# Patient Record
Sex: FEMALE | Race: BLACK OR AFRICAN AMERICAN | Employment: OTHER | URBAN - METROPOLITAN AREA
[De-identification: names, ages, dates, MRNs, and addresses within clinical notes are randomized per-mention and may not be internally consistent; named-entity substitution may affect disease eponyms.]

---

## 2018-04-09 ENCOUNTER — OFFICE VISIT (OUTPATIENT)
Dept: INTERNAL MEDICINE CLINIC | Age: 48
End: 2018-04-09

## 2018-04-09 VITALS
HEIGHT: 66 IN | SYSTOLIC BLOOD PRESSURE: 115 MMHG | WEIGHT: 182 LBS | TEMPERATURE: 98.1 F | HEART RATE: 65 BPM | RESPIRATION RATE: 18 BRPM | BODY MASS INDEX: 29.25 KG/M2 | DIASTOLIC BLOOD PRESSURE: 81 MMHG | OXYGEN SATURATION: 99 %

## 2018-04-09 DIAGNOSIS — H61.22 EXCESSIVE CERUMEN IN LEFT EAR CANAL: ICD-10-CM

## 2018-04-09 DIAGNOSIS — L30.9 ECZEMA, UNSPECIFIED TYPE: ICD-10-CM

## 2018-04-09 DIAGNOSIS — Z86.39 HISTORY OF IRON DEFICIENCY: ICD-10-CM

## 2018-04-09 DIAGNOSIS — Z12.31 SCREENING MAMMOGRAM, ENCOUNTER FOR: ICD-10-CM

## 2018-04-09 DIAGNOSIS — Z98.84 LAP-BAND SURGERY STATUS: ICD-10-CM

## 2018-04-09 DIAGNOSIS — Z00.00 WELL WOMAN EXAM (NO GYNECOLOGICAL EXAM): Primary | ICD-10-CM

## 2018-04-09 RX ORDER — TRIAMCINOLONE ACETONIDE 1 MG/G
CREAM TOPICAL 2 TIMES DAILY
Qty: 30 G | Refills: 0 | Status: SHIPPED | OUTPATIENT
Start: 2018-04-09 | End: 2018-04-11 | Stop reason: SDUPTHER

## 2018-04-09 NOTE — MR AVS SNAPSHOT
[address] [facility], [place], operated by [facility] 
 
 
 [address]  Rona Jalloh [address] [place] [address] Road 
[phone] Patient: Jermaine Minors MRN: [id number] IID:[id number] Visit Information Date & Time Provider Department Dept. Phone Encounter #  
 4/9/2018  2:45 PM Kashif Cunha MD [facility] [address] [id number] Upcoming Health Maintenance Date Due DTaP/Tdap/Td series (1 - Tdap) 1/2/2010 PAP AKA CERVICAL CYTOLOGY 1/1/2017 Influenza Age 5 to Adult 9/1/2018* *Topic was postponed. The date shown is not the original due date. Allergies as of 4/9/2018  Review Complete On: 4/9/2018 By: Kashif Cunha MD  
  
 Severity Noted Reaction Type Reactions Aspirin High 01/29/2015    Anaphylaxis Nsaids (Non-steroidal Anti-inflammatory Drug) High 01/29/2015   Systemic Anaphylaxis Current Immunizations  Reviewed on 4/9/2018 Name Date Td 1/1/2010 Reviewed by Kashif Cunha MD on 4/9/2018 at  3:28 PM  
 Reviewed by Kashif Cunha MD on 4/9/2018 at  3:31 PM  
 Reviewed by Kashif Cunha MD on 4/9/2018 at  3:32 PM  
You Were Diagnosed With   
  
 Codes Comments Well woman exam (no gynecological exam)    -  Primary ICD-10-CM: Z00.00 ICD-9-CM: V70.0 Screening mammogram, encounter for     ICD-10-CM: Z12.31 
ICD-9-CM: V76.12 LAP-BAND surgery status     ICD-10-CM: Z98.84 ICD-9-CM: V45.86 History of iron deficiency     ICD-10-CM: Z86.39 
ICD-9-CM: V12.3 Eczema, unspecified type     ICD-10-CM: L30.9 ICD-9-CM: 692.9 Vitals BP Pulse Temp Resp Height(growth percentile) Weight(growth percentile) 115/81 (BP 1 Location: Left arm, BP Patient Position: Sitting) 65 98.1 °F (36.7 °C) (Oral) 18 5' 6\" (1.676 m) 182 lb (82.6 kg) LMP SpO2 BMI OB Status Smoking Status 03/15/2018 99% 29.38 kg/m2 Having regular periods Former Smoker Vitals History BMI and BSA Data  Body Mass Index Body Surface Area  
 29.38 kg/m 2 1.96 m 2  
  
  
 Preferred Pharmacy Pharmacy Name Phone [id number] [place], [address] AT [address] [phone] Your Updated Medication List  
  
   
This list is accurate as of 4/9/18  3:45 PM.  Always use your most recent med list.  
  
  
  
  
 triamcinolone acetonide 0.1 % topical cream  
Commonly known as:  KENALOG Apply  to affected area two (2) times a day. Prescriptions Sent to Pharmacy Refills  
 triamcinolone acetonide (KENALOG) 0.1 % topical cream 0 Sig: Apply  to affected area two (2) times a day. Class: Normal  
 Pharmacy: [address] #: [phone] Route: Topical  
  
We Performed the Following CBC W/O DIFF [22962 CPT(R)] HEMOGLOBIN A1C WITH EAG [64561 CPT(R)] IRON PROFILE M2990575 CPT(R)] LIPID PANEL [86855 CPT(R)] METABOLIC PANEL, COMPREHENSIVE [96234 CPT(R)] REFERRAL TO OBSTETRICS AND GYNECOLOGY [REF51 Custom] To-Do List   
 Around 04/18/2018 Imaging:  EMMY MAMMO BI SCREENING INCL CAD Referral Information Referral ID Referred By Referred To  
  
 [id number] Meg BARNES MD   
   [address] [place] [address] Phone: [phone] Fax: [phone] Visits Status Start Date End Date 1 New Request 4/9/18 4/9/19 If your referral has a status of pending review or denied, additional information will be sent to support the outcome of this decision. Introducing [facility]! Dear Alicia Eubanks: Thank you for requesting a VOSS account. Our records indicate that you already have an active VOSS account. You can access your account anytime at [email or web address] Did you know that you can access your hospital and ER discharge instructions at any time in VOSS?   You can also review all of your test results from your hospital stay or ER visit. Additional Information If you have questions, please visit the Frequently Asked Questions section of the [employer] website at [email or web address]. [employer]. [email or web address] Remember, [employer] is NOT to be used for urgent needs. For medical emergencies, dial 911. Now available from your iPhone and Android! Please provide this summary of care documentation to your next provider. Your primary care clinician is listed as Urban Sandro. If you have any questions after today's visit, please call [phone].

## 2018-04-09 NOTE — PROGRESS NOTES
Johana Jeffery is a 50 y.o. female  Presenting for her annual checkup and follow-up    Last breast exam: per gyn  Last PAP/pelvic: ? Last colonoscopy: none  Last DEXA:   Health Maintenance   Topic Date Due    DTaP/Tdap/Td series (1 - Tdap) 2010    PAP AKA CERVICAL CYTOLOGY  2017    Influenza Age 5 to Adult  2018 (Originally 2017)       Exercise: moderately active  Diet: generally follows a low fat low cholesterol diet    Vaccinations reviewed  Immunization History   Administered Date(s) Administered    Td 2010       Allergies: Aspirin and Nsaids (non-steroidal anti-inflammatory drug)  Current Outpatient Prescriptions   Medication Sig    triamcinolone acetonide (KENALOG) 0.1 % topical cream Apply  to affected area two (2) times a day. No current facility-administered medications for this visit. has a past medical history of Anemia; Gestational diabetes (); LAP-BAND surgery status (); and Raynaud phenomenon. Past Surgical History:   Procedure Laterality Date    HX  SECTION  2006    HX GI  2008    Lap band    HX WISDOM TEETH EXTRACTION        Social History     Social History    Marital status:      Spouse name: Israel Bryant ()    Number of children: 1    Years of education: N/A     Occupational History    Not on file.      Social History Main Topics    Smoking status: Former Smoker     Packs/day: 1.00     Types: Cigarettes     Quit date: 2006    Smokeless tobacco: Never Used    Alcohol use 4.8 oz/week     4 Glasses of wine, 4 Standard drinks or equivalent per week    Drug use: Not on file    Sexual activity: Yes     Partners: Male     Birth control/ protection: None     Other Topics Concern    Not on file     Social History Narrative     Family History   Problem Relation Age of Onset    Hypertension Mother     COPD Mother     Diabetes Father     Heart Disease Father      CHF    Hypertension Father     Diabetes Brother     Kidney Disease Brother     No Known Problems Daughter        Review of Systems - History obtained from the patient  General ROS: negative for - night sweats, weight gain or weight loss  Cardiovascular ROS: no chest pain, dyspnea on exertion, edema  GYN ROS: reports heavy menses for 3 days, no abnormal bleeding, pelvic pain or discharge, no breast pain or new or enlarging lumps on self exam.    Physical exam  Blood pressure 115/81, pulse 65, temperature 98.1 °F (36.7 °C), temperature source Oral, resp. rate 18, height 5' 6\" (1.676 m), weight 182 lb (82.6 kg), last menstrual period 03/15/2018, SpO2 99 %. Wt Readings from Last 3 Encounters:   04/09/18 182 lb (82.6 kg)   07/01/16 168 lb (76.2 kg)   01/29/15 175 lb (79.4 kg)     she appears well, alert and oriented x 3, pleasant and cooperative. Vitals as noted. No rashes or significant lesions. Neck supple and free of adenopathy, or masses. No thyromegaly or carotid bruits. Cranial nerves normal. Lungs are clear to auscultation. Heart sounds are normal with no murmurs, clicks, gallops or rubs. Abdomen is soft, non- tender, with no masses or organomegaly. Extremities, peripheral pulses and reflexes are normal.  . BREAST EXAM: not examined  PELVIC EXAM: referred      Diagnoses and all orders for this visit:    1. Well woman exam (no gynecological exam)  Recommend GYN eval.    -     EMMY MAMMO BI SCREENING INCL CAD; Future  -     [facility]  -     LIPID PANEL  -     METABOLIC PANEL, COMPREHENSIVE  -     HEMOGLOBIN A1C WITH EAG    2. Screening mammogram, encounter for    3. LAP-BAND surgery status    4. History of iron deficiency  -     CBC W/O DIFF  -     HEMOGLOBIN A1C WITH EAG  -     IRON PROFILE    5. Eczema, unspecified type - behind ears  -     triamcinolone acetonide (KENALOG) 0.1 % topical cream; Apply  to affected area two (2) times a day. 6. Excessive cerumen in left ear canal - Ceruminosis is noted.   Wax is removed by syringing and manual debridement. Instructions for home care to prevent wax buildup are given.         The patient is asked to make an attempt to improve diet and exercise patterns    Return for yearly wellness visits

## 2018-04-10 ENCOUNTER — TELEPHONE (OUTPATIENT)
Dept: INTERNAL MEDICINE CLINIC | Age: 48
End: 2018-04-10

## 2018-04-10 NOTE — TELEPHONE ENCOUNTER
----- Message from Seth Jordan sent at 4/10/2018  4:31 PM EDT -----  Regarding: Dr. Miguelina Marquez Refill  Pt is calling to have her rx \"Triamcinolone\" sent to [facility]. She called the pharmacy and nothing has been sent over. Best contact: [phone].

## 2018-04-11 DIAGNOSIS — L30.9 ECZEMA, UNSPECIFIED TYPE: ICD-10-CM

## 2018-04-11 RX ORDER — TRIAMCINOLONE ACETONIDE 1 MG/G
CREAM TOPICAL 2 TIMES DAILY
Qty: 30 G | Refills: 0 | Status: SHIPPED | OUTPATIENT
Start: 2018-04-11 | End: 2019-02-11 | Stop reason: SDUPTHER

## 2018-05-10 ENCOUNTER — HOSPITAL ENCOUNTER (OUTPATIENT)
Dept: MAMMOGRAPHY | Age: 48
Discharge: HOME | End: 2018-05-10
Attending: INTERNAL MEDICINE
Payer: COMMERCIAL

## 2018-05-10 DIAGNOSIS — Z00.00 WELL WOMAN EXAM (NO GYNECOLOGICAL EXAM): ICD-10-CM

## 2018-05-10 PROCEDURE — 77067 SCR MAMMO BI INCL CAD: CPT

## 2018-05-22 ENCOUNTER — OFFICE VISIT (OUTPATIENT)
Dept: OBGYN CLINIC | Age: 48
End: 2018-05-22

## 2018-05-22 VITALS
BODY MASS INDEX: 28.28 KG/M2 | WEIGHT: 176 LBS | HEIGHT: 66 IN | DIASTOLIC BLOOD PRESSURE: 64 MMHG | SYSTOLIC BLOOD PRESSURE: 106 MMHG

## 2018-05-22 DIAGNOSIS — N92.4 EXCESSIVE BLEEDING IN PREMENOPAUSAL PERIOD: ICD-10-CM

## 2018-05-22 DIAGNOSIS — Z11.51 SPECIAL SCREENING EXAMINATION FOR HUMAN PAPILLOMAVIRUS (HPV): ICD-10-CM

## 2018-05-22 DIAGNOSIS — Z01.419 ENCOUNTER FOR GYNECOLOGICAL EXAMINATION (GENERAL) (ROUTINE) WITHOUT ABNORMAL FINDINGS: Primary | ICD-10-CM

## 2018-05-22 NOTE — PROGRESS NOTES
[facility]  [email or web address]  [phone]    Author MD Kiko, [address] [facility]       Annual Gynecologic Exam  WWE 29-17  Chief Complaint   Patient presents with    Well Woman       Siddhartha Sawant is a 50 y.o. No obstetric history on file. BLACK OR   female who presents for an annual exam.  Patient's last menstrual period was 05/10/2018 (exact date). .    She does not report additional concerns today. Menstrual status:  Her periods are moderate in flow but have been shorter. She does not report dysmenorrhea/painful menses. She does not report irregular bleeding. Sexual history and Contraception:  History   Sexual Activity    Sexual activity: Yes    Partners: Male    Birth control/ protection: None       She does not reports new sexual partner(s) in the last year. The patient does not request STD testing. Preventive Medicine History:  Her most recent Pap smear result: normal per patient, obtained about 3 years ago. Her most recent HR HPV screen was negative, per patient obtained 3 years ago. She does not have a history of NINI 2, 3 or cervical cancer. Breast health:  Last mammogram: was normal.   A mammogram was not scheduled for today. She had a mammogram at [place] on 5/10/18, records in cc. Breast cancer family updated: see FH. Bone health: Reema Vazquez She does not have a history of osteopenia/osteoporosis. Osteoporosis family history updated: see . Past Medical History:   Diagnosis Date    Anemia     prior hx of mild iron def. now w increasing menses    Gestational diabetes 2006    LAP-BAND surgery status 2008    done in [place].  peak wt 264 lb.     Raynaud phenomenon      OB History   No data available       Past Surgical History:   Procedure Laterality Date    HX  SECTION  2006    HX GI  2008    Lap band    HX WISDOM TEETH EXTRACTION       Family History   Problem Relation Age of Onset    Hypertension Mother     COPD Mother     Diabetes Father     Heart Disease Father      CHF    Hypertension Father     Diabetes Brother     Kidney Disease Brother     No Known Problems Daughter      Social History     Social History    Marital status:      Spouse name: Liz Colon ()    Number of children: 1    Years of education: N/A     Occupational History    Not on file. Social History Main Topics    Smoking status: Former Smoker     Packs/day: 1.00     Types: Cigarettes     Quit date: 7/1/2006    Smokeless tobacco: Never Used    Alcohol use 4.8 oz/week     4 Glasses of wine, 4 Standard drinks or equivalent per week    Drug use: Not on file    Sexual activity: Yes     Partners: Male     Birth control/ protection: None     Other Topics Concern    Not on file     Social History Narrative       Allergies   Allergen Reactions    Aspirin Anaphylaxis    Nsaids (Non-Steroidal Anti-Inflammatory Drug) Anaphylaxis       Current Outpatient Prescriptions   Medication Sig    triamcinolone acetonide (KENALOG) 0.1 % topical cream Apply  to affected area two (2) times a day. No current facility-administered medications for this visit.         Patient Active Problem List   Diagnosis Code    LAP-BAND surgery status Z98.84    Gestational diabetes O24.419    Anemia D64.9    Raynaud phenomenon I73.00       Review of Systems - History obtained from the patient  Constitutional: negative for weight loss, fever, night sweats  HEENT: negative for hearing loss, earache, congestion, snoring, sorethroat  CV: negative for chest pain, palpitations, edema  Resp: negative for cough, shortness of breath, wheezing  GI: negative for change in bowel habits, abdominal pain, black or bloody stools  : negative for frequency, dysuria, hematuria, vaginal discharge, see HPI  MSK: negative for back pain, joint pain, muscle pain  Breast: negative for breast lumps, nipple discharge, galactorrhea  Skin :negative for itching, rash, hives  Neuro: negative for dizziness, headache, confusion, weakness  Psych: negative for anxiety, depression, change in mood  Heme/lymph: negative for bleeding, bruising, pallor    Physical Exam  Visit Vitals    /64    Ht 5' 6\" (1.676 m)    Wt 176 lb (79.8 kg)    LMP 05/10/2018 (Exact Date)    BMI 28.41 kg/m2       Constitutional  · Appearance: well-nourished, well developed, alert, in no acute distress    HENT  · Head and Face: appears normal    Neck  · Inspection/Palpation: normal appearance, no masses or tenderness  · Lymph Nodes: no lymphadenopathy present  · Thyroid: gland size normal, nontender, no nodules or masses present on palpation    Chest  · Respiratory Effort: breathing unlabored  · Auscultation: normal breath sounds    Cardiovascular  · Heart:  · Auscultation: regular rate and rhythm without murmur    Breasts  · Inspection of Breasts: breasts symmetrical, no skin changes, no discharge present, nipple appearance normal, no skin retraction present  · Palpation of Breasts and Axillae: no masses present on palpation, no breast tenderness  · Axillary Lymph Nodes: no lymphadenopathy present    Gastrointestinal  · Abdominal Examination: abdomen non-tender to palpation, normal bowel sounds, no masses present  · Liver and spleen: no hepatomegaly present, spleen not palpable  · Hernias: no hernias identified    Genitourinary  · External Genitalia: normal appearance for age, no discharge present, no tenderness present, no inflammatory lesions present, no masses present, without atrophy present  · Vagina: normal vaginal vault without central or paravaginal defects, no discharge present, no inflammatory lesions present, no masses present  · Bladder: non-tender to palpation  · Urethra: appears normal  · Cervix: normal   · Uterus: normal size, shape and consistency  · Adnexa: no adnexal tenderness present, no adnexal masses present  · Perineum: perineum within normal limits, no evidence of trauma, no rashes or skin lesions present  · Anus: anus within normal limits, no hemorrhoids present  · Inguinal Lymph Nodes: no lymphadenopathy present    Skin  · General Inspection: no rash, no lesions identified    Neurologic/Psychiatric  · Mental Status:  · Orientation: grossly oriented to person, place and time  · Mood and Affect: mood normal, affect appropriate    Assessment:  50 y.o. No obstetric history on file. for well woman exam  Encounter Diagnoses   Name Primary?  Encounter for gynecological examination (general) (routine) without abnormal findings Yes    Special screening examination for human papillomavirus (HPV)     Excessive bleeding in premenopausal period        Plan:  The patient was counseled about diet, exercise, healthy lifestyle  We discussed current self breast exam and mammogram recommendations  We discussed current pap smear and HR HPV testing guidelines  We recommend follow up in one year for routine annual gynecologic exam or sooner if needed  We recommend follow up with a primary care physician for any chronic medical problems or non-gynecologic concerns    We discussed calcium/vitamin D/weight bearing exercise and osteoporosis prevention  Handouts were given to the patient  We discussed safer NSAID dosing for heavy cycles. Pt was advised to take this dose with food and not to take for more than 5 days. Disc RBA including bleeding/gastric irritation. JACINTO NUNEZ if NI to discuss other options. We discussed typical perimenopausal bleeding patterns and symptoms. I recommend that she notify MD for chaotic or symptomatic bleeding, or bleeding in between menses or intermenstrual bleeding for additional evaluation. She can also schedule a consult to discuss management of symptoms of perimenopause that are concerning her or interfering with her life or day to day function or activity.    Pt prefers observation    Disc HPV vaccine for daughter, h/o given       Folllow up:  [x] return for annual well woman exam in one year or sooner if she is having problems  [] follow up and ultrasound  [x] mammogram  [] 6 months  [] 3 months  [] 1 month    Orders Placed This Encounter    PAP IG, HPV AND RFX HPV 35/65,58([id number])       No results found for any visits on 05/22/18.

## 2018-05-22 NOTE — PATIENT INSTRUCTIONS
Breast Self-Exam: Care Instructions  Your Care Instructions    A breast self-exam is when you check your breasts for lumps or changes. This regular exam helps you learn how your breasts normally look and feel. Most breast problems or changes are not because of cancer. Breast self-exam is not a substitute for a mammogram. Having regular breast exams by your doctor and regular mammograms improve your chances of finding any problems with your breasts. Some women set a time each month to do a step-by-step breast self-exam. Other women like a less formal system. They might look at their breasts as they brush their teeth, or feel their breasts once in a while in the shower. If you notice a change in your breast, tell your doctor. Follow-up care is a key part of your treatment and safety. Be sure to make and go to all appointments, and call your doctor if you are having problems. It's also a good idea to know your test results and keep a list of the medicines you take. How do you do a breast self-exam?  · The best time to examine your breasts is usually one week after your menstrual period begins. Your breasts should not be tender then. If you do not have periods, you might do your exam on a day of the month that is easy to remember. · To examine your breasts:  ¨ Remove all your clothes above the waist and lie down. When you are lying down, your breast tissue spreads evenly over your chest wall, which makes it easier to feel all your breast tissue. ¨ Use the pads-not the fingertips-of the 3 middle fingers of your left hand to check your right breast. Move your fingers slowly in small coin-sized circles that overlap. ¨ Use three levels of pressure to feel of all your breast tissue. Use light pressure to feel the tissue close to the skin surface. Use medium pressure to feel a little deeper. Use firm pressure to feel your tissue close to your breastbone and ribs.  Use each pressure level to feel your breast tissue before moving on to the next spot. ¨ Check your entire breast, moving up and down as if following a strip from the collarbone to the bra line, and from the armpit to the ribs. Repeat until you have covered the entire breast.  ¨ Repeat this procedure for your left breast, using the pads of the 3 middle fingers of your right hand. · To examine your breasts while in the shower:  ¨ Place one arm over your head and lightly soap your breast on that side. ¨ Using the pads of your fingers, gently move your hand over your breast (in the strip pattern described above), feeling carefully for any lumps or changes. ¨ Repeat for the other breast.  · Have your doctor inspect anything you notice to see if you need further testing. Where can you learn more? Go to http://sharifa-valente.info/. Enter P148 in the search box to learn more about \"Breast Self-Exam: Care Instructions. \"  Current as of: May 12, 2017  Content Version: 11.4  © 4850-3923 [employer]wise, [employer]. Care instructions adapted under license by [employer] (which disclaims liability or warranty for this information). If you have questions about a medical condition or this instruction, always ask your healthcare professional. [place] [address] any warranty or liability for your use of this information.

## 2018-05-22 NOTE — MR AVS SNAPSHOT
[address] [facility] [address]7 [facility] 
[phone] Patient: Kelvin Fajardo MRN: [id number] HMB:[birth year] Visit Information Date & Time Provider Department Dept. Phone Encounter #  
 5/22/2018 10:40 AM MD [facility] [address] [id number] Follow-up Instructions Return in about 1 year (around 5/22/2019), or if symptoms worsen or fail to improve, for Annual exam. Upcoming Health Maintenance Date Due  
 PAP AKA CERVICAL CYTOLOGY 1/1/2017 Influenza Age 5 to Adult 9/1/2018* *Topic was postponed. The date shown is not the original due date. Allergies as of 5/22/2018  Review Complete On: 5/22/2018 By: Alva Hawthorne LPN Severity Noted Reaction Type Reactions Aspirin High 01/29/2015    Anaphylaxis Nsaids (Non-steroidal Anti-inflammatory Drug) High 01/29/2015   Systemic Anaphylaxis Current Immunizations  Reviewed on 4/9/2018 Name Date Td 1/1/2010 Not reviewed this visit You Were Diagnosed With   
  
 Codes Comments Encounter for gynecological examination (general) (routine) without abnormal findings    -  Primary ICD-10-CM: F18.960 ICD-9-CM: V72.31 Vitals BP Height(growth percentile) Weight(growth percentile) LMP BMI OB Status 106/64 5' 6\" (1.676 m) 176 lb (79.8 kg) 05/10/2018 (Exact Date) 28.41 kg/m2 Having regular periods Smoking Status Former Smoker BMI and BSA Data Body Mass Index Body Surface Area  
 28.41 kg/m 2 1.93 m 2 Preferred Pharmacy Pharmacy Name Phone [id number] [place], [address] AT [facility] [phone] Your Updated Medication List  
  
   
This list is accurate as of 5/22/18 11:12 AM.  Always use your most recent med list.  
  
  
  
  
 triamcinolone acetonide 0.1 % topical cream  
Commonly known as:  KENALOG Apply  to affected area two (2) times a day. Follow-up Instructions Return in about 1 year (around 5/22/2019), or if symptoms worsen or fail to improve, for Annual exam.  
  
  
Patient Instructions Breast Self-Exam: Care Instructions Your Care Instructions A breast self-exam is when you check your breasts for lumps or changes. This regular exam helps you learn how your breasts normally look and feel. Most breast problems or changes are not because of cancer. Breast self-exam is not a substitute for a mammogram. Having regular breast exams by your doctor and regular mammograms improve your chances of finding any problems with your breasts. Some women set a time each month to do a step-by-step breast self-exam. Other women like a less formal system. They might look at their breasts as they brush their teeth, or feel their breasts once in a while in the shower. If you notice a change in your breast, tell your doctor. Follow-up care is a key part of your treatment and safety. Be sure to make and go to all appointments, and call your doctor if you are having problems. It's also a good idea to know your test results and keep a list of the medicines you take. How do you do a breast self-exam? 
· The best time to examine your breasts is usually one week after your menstrual period begins. Your breasts should not be tender then. If you do not have periods, you might do your exam on a day of the month that is easy to remember. · To examine your breasts: ¨ Remove all your clothes above the waist and lie down. When you are lying down, your breast tissue spreads evenly over your chest wall, which makes it easier to feel all your breast tissue. ¨ Use the pads-not the fingertips-of the 3 middle fingers of your left hand to check your right breast. Move your fingers slowly in small coin-sized circles that overlap. ¨ Use three levels of pressure to feel of all your breast tissue.  Use light pressure to feel the tissue close to the skin surface. Use medium pressure to feel a little deeper. Use firm pressure to feel your tissue close to your breastbone and ribs. Use each pressure level to feel your breast tissue before moving on to the next spot. ¨ Check your entire breast, moving up and down as if following a strip from the collarbone to the bra line, and from the armpit to the ribs. Repeat until you have covered the entire breast. 
¨ Repeat this procedure for your left breast, using the pads of the 3 middle fingers of your right hand. · To examine your breasts while in the shower: 
¨ Place one arm over your head and lightly soap your breast on that side. ¨ Using the pads of your fingers, gently move your hand over your breast (in the strip pattern described above), feeling carefully for any lumps or changes. ¨ Repeat for the other breast. 
· Have your doctor inspect anything you notice to see if you need further testing. Where can you learn more? Go to http://sharifa-valente.info/. Enter P148 in the search box to learn more about \"Breast Self-Exam: Care Instructions. \" Current as of: May 12, 2017 Content Version: 11.4 © 9280-5950 [employer]. Care instructions adapted under license by [employer] (which disclaims liability or warranty for this information). If you have questions about a medical condition or this instruction, always ask your healthcare professional. [place] [address] any warranty or liability for your use of this information. Introducing [facility]! Dear Freddie Hart: Thank you for requesting a [employer] account. Our records indicate that you already have an active [employer] account. You can access your account anytime at [email or web address] (Closed). [email or web address] (Closed) Did you know that you can access your hospital and ER discharge instructions at any time in [employer]?   You can also review all of your test results from your hospital stay or ER visit. Additional Information If you have questions, please visit the Frequently Asked Questions section of the [employer] website at [email or web address] Remember, [employer] is NOT to be used for urgent needs. For medical emergencies, dial 911. Now available from your iPhone and Android! Please provide this summary of care documentation to your next provider. Your primary care clinician is listed as Lauren Goodell. If you have any questions after today's visit, please call [phone].

## 2018-05-24 LAB
CYTOLOGIST CVX/VAG CYTO: NORMAL
CYTOLOGY CVX/VAG DOC THIN PREP: NORMAL
CYTOLOGY HISTORY:: NORMAL
DX ICD CODE: NORMAL
HPV I/H RISK 1 DNA CVX QL PROBE+SIG AMP: NEGATIVE
Lab: NORMAL
OTHER STN SPEC: NORMAL
PATH REPORT.FINAL DX SPEC: NORMAL
STAT OF ADQ CVX/VAG CYTO-IMP: NORMAL

## 2019-02-11 ENCOUNTER — OFFICE VISIT (OUTPATIENT)
Dept: INTERNAL MEDICINE CLINIC | Age: 49
End: 2019-02-11

## 2019-02-11 VITALS
TEMPERATURE: 98.7 F | OXYGEN SATURATION: 99 % | HEART RATE: 73 BPM | SYSTOLIC BLOOD PRESSURE: 110 MMHG | WEIGHT: 178.13 LBS | DIASTOLIC BLOOD PRESSURE: 72 MMHG | RESPIRATION RATE: 18 BRPM | HEIGHT: 66 IN | BODY MASS INDEX: 28.63 KG/M2

## 2019-02-11 DIAGNOSIS — Z00.00 PREVENTATIVE HEALTH CARE: ICD-10-CM

## 2019-02-11 DIAGNOSIS — D50.8 OTHER IRON DEFICIENCY ANEMIA: ICD-10-CM

## 2019-02-11 DIAGNOSIS — K21.9 GASTROESOPHAGEAL REFLUX DISEASE, ESOPHAGITIS PRESENCE NOT SPECIFIED: Primary | ICD-10-CM

## 2019-02-11 DIAGNOSIS — Z13.21 ENCOUNTER FOR VITAMIN DEFICIENCY SCREENING: ICD-10-CM

## 2019-02-11 DIAGNOSIS — Z98.84 LAP-BAND SURGERY STATUS: ICD-10-CM

## 2019-02-11 RX ORDER — RANITIDINE 300 MG/1
300 TABLET ORAL DAILY
Qty: 30 TAB | Refills: 0
Start: 2019-02-11

## 2019-02-11 NOTE — PROGRESS NOTES
HISTORY OF PRESENT ILLNESS Chief Complaint Patient presents with  GERD  
  chest discomfort with eating  for 5-6 days. Now resolved Presents with chest pains for 7 day(s). Occurs when drinking coffee and after eating. Mild belching increase. Sometimes food gets stuck. Has lap-band  in [place]. Last eval 7 years ago in [place]. Pain is not exertional.  
No hx esophagus. Kelli Perdomo No SOB. No dark stools Took gas-ex Review of Systems All other systems reviewed and are negative, except as noted in HPI Past Medical and Surgical History 
 has a past medical history of Anemia, Gestational diabetes (), HSV-2 infection, LAP-BAND surgery status (), Pap smear for cervical cancer screening (2018), and Raynaud phenomenon. has a past surgical history that includes hx gi (); hx  section (2006); and hx wisdom teeth extraction. reports that she quit smoking about 12 years ago. Her smoking use included cigarettes. She smoked 1.00 pack per day. she has never used smokeless tobacco. She reports that she drinks about 4.8 oz of alcohol per week. family history includes COPD in her mother; Diabetes in her brother and father; Heart Disease in her father; Hypertension in her father and mother; Kidney Disease in her brother; No Known Problems in her daughter. Physical Exam  
Nursing note and vitals reviewed. Blood pressure 110/72, pulse 73, temperature 98.7 °F (37.1 °C), temperature source Oral, resp. rate 18, height 5' 6\" (1.676 m), weight 178 lb 2 oz (80.8 kg), last menstrual period 2019, SpO2 99 %. Constitutional: In no distress. Eyes: Conjunctivae are normal. 
HEENT:  No LAD or thyromegaly Cardiovascular: Normal rate. regular rhythm. No murmurs No edema Pulmonary/Chest: Effort normal. clear to ausculation blaterally Musculoskeletal:  no edema. Abd: 
Neurological: Alert and oriented. Grossly intact cranial nerves and motor function. Skin: No rash noted. Psychiatric: Normal mood and affect. Behavior is normal.  
 
ASSESSMENT and PLAN Diagnoses and all orders for this visit: 
 
1. Gastroesophageal reflux disease, esophagitis presence not specified Improved over the last few days. Needs eval by bariatric surgery given lap-band hx. May need endoscopy. Will first check esophogram. 
-     XR BA SWALLOW ESOPHOGRAM; Future 
-     raNITIdine (ZANTAC) 300 mg tab; Take 1 Tab by mouth daily. 2. LAP-BAND surgery status -     XR BA SWALLOW ESOPHOGRAM; Future 
-     REFERRAL TO BARIATRIC SURGERY 3. Other iron deficiency anemia 
-     CBC W/O DIFF 
-     IRON PROFILE 
 
4. Encounter for vitamin deficiency screening 5. Preventative health care -     LIPID PANEL 
-     METABOLIC PANEL, COMPREHENSIVE 
 
 
 
lab results and schedule of future lab studies reviewed with patient 
reviewed medications and side effects in detail Return to clinic for further evaluation if new symptoms develop or if current symptoms worsen or fail to resolve. There are no Patient Instructions on file for this visit.

## 2019-02-12 LAB
ALBUMIN SERPL-MCNC: 3.9 G/DL (ref 3.5–5.5)
ALBUMIN/GLOB SERPL: 1.4 {RATIO} (ref 1.2–2.2)
ALP SERPL-CCNC: 51 IU/L (ref 39–117)
ALT SERPL-CCNC: 11 IU/L (ref 0–32)
AST SERPL-CCNC: 18 IU/L (ref 0–40)
BILIRUB SERPL-MCNC: 0.5 MG/DL (ref 0–1.2)
BUN SERPL-MCNC: 14 MG/DL (ref 6–24)
BUN/CREAT SERPL: 16 (ref 9–23)
CALCIUM SERPL-MCNC: 9 MG/DL (ref 8.7–10.2)
CHLORIDE SERPL-SCNC: 104 MMOL/L (ref 96–106)
CHOLEST SERPL-MCNC: 132 MG/DL (ref 100–199)
CO2 SERPL-SCNC: 21 MMOL/L (ref 20–29)
CREAT SERPL-MCNC: 0.85 MG/DL (ref 0.57–1)
ERYTHROCYTE [DISTWIDTH] IN BLOOD BY AUTOMATED COUNT: 17.8 % (ref 12.3–15.4)
GLOBULIN SER CALC-MCNC: 2.7 G/DL (ref 1.5–4.5)
GLUCOSE SERPL-MCNC: 70 MG/DL (ref 65–99)
HCT VFR BLD AUTO: 38.9 % (ref 34–46.6)
HDLC SERPL-MCNC: 62 MG/DL
HGB BLD-MCNC: 12.1 G/DL (ref 11.1–15.9)
INTERPRETATION, 910389: NORMAL
IRON SATN MFR SERPL: 32 % (ref 15–55)
IRON SERPL-MCNC: 100 UG/DL (ref 27–159)
LDLC SERPL CALC-MCNC: 55 MG/DL (ref 0–99)
MCH RBC QN AUTO: 22.9 PG (ref 26.6–33)
MCHC RBC AUTO-ENTMCNC: 31.1 G/DL (ref 31.5–35.7)
MCV RBC AUTO: 74 FL (ref 79–97)
PLATELET # BLD AUTO: 318 X10E3/UL (ref 150–379)
POTASSIUM SERPL-SCNC: 4.7 MMOL/L (ref 3.5–5.2)
PROT SERPL-MCNC: 6.6 G/DL (ref 6–8.5)
RBC # BLD AUTO: 5.29 X10E6/UL (ref 3.77–5.28)
SODIUM SERPL-SCNC: 143 MMOL/L (ref 134–144)
TIBC SERPL-MCNC: 310 UG/DL (ref 250–450)
TRIGL SERPL-MCNC: 73 MG/DL (ref 0–149)
UIBC SERPL-MCNC: 210 UG/DL (ref 131–425)
VLDLC SERPL CALC-MCNC: 15 MG/DL (ref 5–40)
WBC # BLD AUTO: 6.8 X10E3/UL (ref 3.4–10.8)

## 2021-09-14 ENCOUNTER — APPOINTMENT (OUTPATIENT)
Dept: GENERAL RADIOLOGY | Age: 51
End: 2021-09-14
Attending: NURSE PRACTITIONER
Payer: COMMERCIAL

## 2021-09-14 ENCOUNTER — HOSPITAL ENCOUNTER (EMERGENCY)
Age: 51
Discharge: HOME | End: 2021-09-14
Attending: EMERGENCY MEDICINE
Payer: COMMERCIAL

## 2021-09-14 VITALS
HEART RATE: 63 BPM | TEMPERATURE: 98.2 F | HEIGHT: 67 IN | RESPIRATION RATE: 18 BRPM | DIASTOLIC BLOOD PRESSURE: 68 MMHG | SYSTOLIC BLOOD PRESSURE: 122 MMHG | WEIGHT: 162.04 LBS | OXYGEN SATURATION: 100 % | BODY MASS INDEX: 25.43 KG/M2

## 2021-09-14 DIAGNOSIS — M79.18 MUSCULOSKELETAL PAIN: Primary | ICD-10-CM

## 2021-09-14 LAB
APPEARANCE UR: ABNORMAL
BACTERIA URNS QL MICRO: ABNORMAL /HPF
BILIRUB UR QL: NEGATIVE
COLOR UR: ABNORMAL
COMMENT, HOLDF: NORMAL
EPITH CASTS URNS QL MICRO: ABNORMAL /LPF
GLUCOSE UR STRIP.AUTO-MCNC: NEGATIVE MG/DL
HGB UR QL STRIP: NEGATIVE
KETONES UR QL STRIP.AUTO: 15 MG/DL
LEUKOCYTE ESTERASE UR QL STRIP.AUTO: NEGATIVE
MUCOUS THREADS URNS QL MICRO: ABNORMAL /LPF
NITRITE UR QL STRIP.AUTO: NEGATIVE
PH UR STRIP: 5.5 [PH] (ref 5–8)
PROT UR STRIP-MCNC: NEGATIVE MG/DL
RBC #/AREA URNS HPF: ABNORMAL /HPF (ref 0–5)
SAMPLES BEING HELD,HOLD: NORMAL
SP GR UR REFRACTOMETRY: 1.02 (ref 1–1.03)
UROBILINOGEN UR QL STRIP.AUTO: 0.2 EU/DL (ref 0.2–1)
WBC URNS QL MICRO: ABNORMAL /HPF (ref 0–4)

## 2021-09-14 PROCEDURE — 81001 URINALYSIS AUTO W/SCOPE: CPT

## 2021-09-14 PROCEDURE — 74019 RADEX ABDOMEN 2 VIEWS: CPT

## 2021-09-14 PROCEDURE — 99282 EMERGENCY DEPT VISIT SF MDM: CPT

## 2021-09-14 RX ORDER — METHOCARBAMOL 750 MG/1
750 TABLET, FILM COATED ORAL 4 TIMES DAILY
Qty: 20 TABLET | Refills: 0 | Status: SHIPPED | OUTPATIENT
Start: 2021-09-14

## 2021-09-14 NOTE — ED NOTES

## 2021-09-14 NOTE — Clinical Note
[address]  [facility] EMERGENCY DEPT  [address] [id number] [phone]    Work/School Note    Date: 9/14/2021    To Whom It May concern:    Brian Flood was seen and treated today in the emergency room by the following provider(s):  Attending Provider: Christy Rowland DO  Nurse Practitioner: Griselda Cox NP.      Brian Flood is excused from work/school on 09/14/21 and 09/15/21. She is medically clear to return to work/school on 9/16/2021.        Sincerely,          Dori Lancaster NP

## 2021-09-14 NOTE — ED TRIAGE NOTES
Patient arrives with complaint of left sided lower back pain. Denies no recent injury. Pain present since Sunday morning. Denies pain with urination, N/V, abdominal pain, cough, fever, SOB, chest pain.

## 2021-09-14 NOTE — ED PROVIDER NOTES
This is a 20-year-old female who presents ambulatory to the emergency room with complaints of lower back pain. Patient states is specifically located on the left side at the lumbar spine area. States it started on  morning. Denies any trauma or recent injury to her lumbar spine. Denies any chest pain, shortness of breath, dizziness, nausea or vomiting, fever or chills. Denies any difficulty urinating, dysuria, urinary urgency or frequency. States her pain is 7 out of 10 located on the left side. States it is aching in nature. Has not taken any medication prior to arrival for her symptoms. There are no further complaints at this time. Nikkie Sutherland MD  Past Medical History:  No date: Anemia      Comment:  prior hx of mild iron def. now w increasing menses  2006: Gestational diabetes  No date: HSV-2 infection  2008: LAP-BAND surgery status      Comment:  done in [place].  peak wt 264 lb.  2018: Pap smear for cervical cancer screening      Comment:  Neg pap and neg HPV  No date: Raynaud phenomenon  Past Surgical History:  2006: HX  SECTION  2008: HX GI      Comment:  Lap band  No date: HX WISDOM TEETH EXTRACTION             Past Medical History:   Diagnosis Date    Anemia     prior hx of mild iron def. now w increasing menses    Gestational diabetes 2006    HSV-2 infection     LAP-BAND surgery status 2008    done in [place].  peak wt 264 lb.     Pap smear for cervical cancer screening 2018    Neg pap and neg HPV    Raynaud phenomenon        Past Surgical History:   Procedure Laterality Date    HX  SECTION  2006    HX GI  2008    Lap band    HX WISDOM TEETH EXTRACTION           Family History:   Problem Relation Age of Onset    Hypertension Mother     COPD Mother     Diabetes Father     Heart Disease Father         CHF    Hypertension Father     Diabetes Brother     Kidney Disease Brother     No Known Problems Daughter        Social History Socioeconomic History    Marital status:      Spouse name: Josette Donovan ()    Number of children: 1    Years of education: Not on file    Highest education level: Not on file   Occupational History    Not on file   Tobacco Use    Smoking status: Former Smoker     Packs/day: 1.00     Types: Cigarettes     Quit date: 7/1/2006     Years since quitting: 15.2    Smokeless tobacco: Never Used   Substance and Sexual Activity    Alcohol use: Yes     Alcohol/week: 8.0 standard drinks     Types: 4 Glasses of wine, 4 Standard drinks or equivalent per week    Drug use: Not on file    Sexual activity: Yes     Partners: Male     Birth control/protection: None   Other Topics Concern    Not on file   Social History Narrative    Not on file     Social Determinants of Health     Financial Resource Strain:     Difficulty of Paying Living Expenses:    Food Insecurity:     Worried About Running Out of Food in the Last Year:     [address] in the Last Year:    Transportation Needs:     Lack of Transportation (Medical):  Lack of Transportation (Non-Medical):    Physical Activity:     Days of Exercise per Week:     Minutes of Exercise per Session:    Stress:     Feeling of Stress :    Social Connections:     Frequency of Communication with Friends and Family:     Frequency of Social Gatherings with Friends and Family:     Attends Taoism Services:     Active Member of Clubs or Organizations:     Attends Club or Organization Meetings:     Marital Status:    Intimate Partner Violence:     Fear of Current or Ex-Partner:     Emotionally Abused:     Physically Abused:     Sexually Abused: ALLERGIES: Aspirin and Nsaids (non-steroidal anti-inflammatory drug)    Review of Systems   Constitutional: Negative for appetite change, chills, diaphoresis, fatigue and fever.    HENT: Negative for congestion, ear discharge, ear pain, sinus pressure, sinus pain, sore throat and trouble swallowing. Eyes: Negative for photophobia, pain, redness and visual disturbance. Respiratory: Negative for chest tightness, shortness of breath and wheezing. Cardiovascular: Negative for chest pain and palpitations. Gastrointestinal: Negative for abdominal distention, nausea and vomiting. Endocrine: Negative. Genitourinary: Negative for difficulty urinating, flank pain, frequency and urgency. Musculoskeletal: Positive for back pain. Negative for neck pain and neck stiffness. Skin: Negative for color change, pallor, rash and wound. Allergic/Immunologic: Negative. Neurological: Negative for dizziness, speech difficulty, weakness and headaches. Hematological: Does not bruise/bleed easily. Psychiatric/Behavioral: Negative for behavioral problems. The patient is not nervous/anxious. Vitals:    09/14/21 1613   BP: 122/68   Pulse: 63   Resp: 18   Temp: 98.2 °F (36.8 °C)   SpO2: 100%   Weight: 73.5 kg (162 lb 0.6 oz)   Height: 5' 7\" (1.702 m)            Physical Exam  Vitals and nursing note reviewed. Constitutional:       General: She is not in acute distress. Appearance: Normal appearance. She is well-developed. She is not ill-appearing. HENT:      Head: Normocephalic and atraumatic. Right Ear: External ear normal.      Left Ear: External ear normal.      Nose: Nose normal.      Mouth/Throat:      Mouth: Mucous membranes are moist.   Eyes:      General:         Right eye: No discharge. Left eye: No discharge. Conjunctiva/sclera: Conjunctivae normal.      Pupils: Pupils are equal, round, and reactive to light. Neck:      Vascular: No JVD. Trachea: No tracheal deviation. Cardiovascular:      Rate and Rhythm: Normal rate and regular rhythm. Pulses: Normal pulses. Heart sounds: Normal heart sounds. No murmur heard. No gallop. Pulmonary:      Effort: Pulmonary effort is normal. No respiratory distress.       Breath sounds: Normal breath sounds. No wheezing or rales. Chest:      Chest wall: No tenderness. Abdominal:      General: Bowel sounds are normal. There is no distension. Palpations: Abdomen is soft. Tenderness: There is no abdominal tenderness. There is no guarding or rebound. Genitourinary:     Comments: Negative    Musculoskeletal:         General: Tenderness (lumbar on the left paraspinous muscles) present. Normal range of motion. Cervical back: Normal range of motion and neck supple. Skin:     General: Skin is warm and dry. Capillary Refill: Capillary refill takes less than 2 seconds. Coloration: Skin is not pale. Findings: No erythema or rash. Neurological:      General: No focal deficit present. Mental Status: She is alert and oriented to person, place, and time. Motor: No weakness. Coordination: Coordination normal.   Psychiatric:         Mood and Affect: Mood normal.         Behavior: Behavior normal.         Thought Content: Thought content normal.         Judgment: Judgment normal.          MDM  Number of Diagnoses or Management Options  Musculoskeletal pain: new and requires workup  Diagnosis management comments: Differential diagnosis includes musculoskeletal pain, degenerative disc disease, osteoarthritis and others. Discharged to home and follow-up with PCP. Follow-up with orthospine as an outpatient. Return to the emergency room with worsening symptoms. Patient in agreement with plan of care.        Amount and/or Complexity of Data Reviewed  Clinical lab tests: ordered and reviewed  Tests in the radiology section of CPT®: ordered and reviewed         Labs Reviewed   URINALYSIS W/MICROSCOPIC - Abnormal; Notable for the following components:       Result Value    Appearance CLOUDY (*)     Ketone 15 (*)     Epithelial cells MODERATE (*)     Bacteria 1+ (*)     Mucus 2+ (*)     All other components within normal limits   SAMPLES BEING HELD     XR ABD FLAT/ ERECT    Result Date: 9/14/2021  No acute process. 6:42 PM  Pt has been reexamined. Pt has no new complaints, changes or physical findings. Care plan outlined and precautions discussed. All available results were reviewed with pt. All medications were reviewed with pt. All of pt's questions and concerns were addressed. Pt agrees to F/U as instructed and agrees to return to ED upon further deterioration. Pt is ready to go home. Tania Turcios, NP    Please note that this dictation was completed with StarsVu, the devsisters voice recognition software. Quite often unanticipated grammatical, syntax, homophones, and other interpretive errors are inadvertently transcribed by the computer software. Please disregard these errors. Please excuse any errors that have escaped final proofreading. Thank you.     Procedures

## 2023-05-19 RX ORDER — METHOCARBAMOL 750 MG/1
750 TABLET, FILM COATED ORAL 4 TIMES DAILY
COMMUNITY
Start: 2021-09-14

## 2023-05-19 RX ORDER — RANITIDINE 300 MG/1
300 TABLET ORAL DAILY
COMMUNITY
Start: 2019-02-11

## 2023-07-19 ENCOUNTER — TRANSCRIBE ORDERS (OUTPATIENT)
Facility: HOSPITAL | Age: 53
End: 2023-07-19

## 2023-07-19 DIAGNOSIS — M25.812 MASS OF JOINT OF LEFT SHOULDER: ICD-10-CM

## 2023-07-19 DIAGNOSIS — M65.222 CALCIFIC TENDINITIS OF LEFT UPPER ARM: Primary | ICD-10-CM

## 2023-07-20 ENCOUNTER — HOSPITAL ENCOUNTER (OUTPATIENT)
Facility: HOSPITAL | Age: 53
Discharge: HOME | End: 2023-07-20
Payer: MEDICAID

## 2023-07-20 DIAGNOSIS — M65.222 CALCIFIC TENDINITIS OF LEFT UPPER ARM: ICD-10-CM

## 2023-07-20 DIAGNOSIS — M25.812 MASS OF JOINT OF LEFT SHOULDER: ICD-10-CM

## 2023-07-20 PROCEDURE — 73221 MRI JOINT UPR EXTREM W/O DYE: CPT
